# Patient Record
Sex: FEMALE | Race: WHITE | NOT HISPANIC OR LATINO | Employment: PART TIME | URBAN - METROPOLITAN AREA
[De-identification: names, ages, dates, MRNs, and addresses within clinical notes are randomized per-mention and may not be internally consistent; named-entity substitution may affect disease eponyms.]

---

## 2018-01-08 ENCOUNTER — OFFICE VISIT (OUTPATIENT)
Dept: FAMILY MEDICINE | Facility: CLINIC | Age: 28
End: 2018-01-08
Payer: COMMERCIAL

## 2018-01-08 VITALS
WEIGHT: 147.5 LBS | HEIGHT: 62 IN | DIASTOLIC BLOOD PRESSURE: 60 MMHG | BODY MASS INDEX: 27.14 KG/M2 | TEMPERATURE: 99 F | SYSTOLIC BLOOD PRESSURE: 106 MMHG | HEART RATE: 81 BPM | OXYGEN SATURATION: 99 %

## 2018-01-08 DIAGNOSIS — Z13.220 SCREENING CHOLESTEROL LEVEL: ICD-10-CM

## 2018-01-08 DIAGNOSIS — R53.82 CHRONIC FATIGUE: Primary | ICD-10-CM

## 2018-01-08 DIAGNOSIS — R11.0 NAUSEA: ICD-10-CM

## 2018-01-08 LAB
B-HCG UR QL: NEGATIVE
BILIRUB SERPL-MCNC: NEGATIVE MG/DL
BLOOD URINE, POC: ABNORMAL
COLOR, POC UA: YELLOW
CTP QC/QA: YES
GLUCOSE UR QL STRIP: NEGATIVE
KETONES UR QL STRIP: NEGATIVE
LEUKOCYTE ESTERASE URINE, POC: NEGATIVE
NITRITE, POC UA: NEGATIVE
PH, POC UA: 5
PROTEIN, POC: NEGATIVE
SPECIFIC GRAVITY, POC UA: 1.03
UROBILINOGEN, POC UA: 0.2

## 2018-01-08 PROCEDURE — 81001 URINALYSIS AUTO W/SCOPE: CPT | Mod: S$GLB,,, | Performed by: NURSE PRACTITIONER

## 2018-01-08 PROCEDURE — 99203 OFFICE O/P NEW LOW 30 MIN: CPT | Mod: 25,S$GLB,, | Performed by: NURSE PRACTITIONER

## 2018-01-08 PROCEDURE — 81025 URINE PREGNANCY TEST: CPT | Mod: S$GLB,,, | Performed by: NURSE PRACTITIONER

## 2018-01-08 PROCEDURE — 99999 PR PBB SHADOW E&M-NEW PATIENT-LVL III: CPT | Mod: PBBFAC,,, | Performed by: NURSE PRACTITIONER

## 2018-01-08 RX ORDER — ONDANSETRON 4 MG/1
4 TABLET, ORALLY DISINTEGRATING ORAL EVERY 6 HOURS PRN
Qty: 30 TABLET | Refills: 0 | Status: SHIPPED | OUTPATIENT
Start: 2018-01-08 | End: 2020-08-10 | Stop reason: ALTCHOICE

## 2018-01-08 RX ORDER — NORGESTIMATE AND ETHINYL ESTRADIOL 0.25-0.035
KIT ORAL
COMMUNITY
Start: 2018-01-07 | End: 2020-08-10 | Stop reason: ALTCHOICE

## 2018-01-08 NOTE — PROGRESS NOTES
Subjective:       Patient ID: Ruy Gayle is a 27 y.o. female.    Chief Complaint: Establish Care; Fatigue (both started over the summer); and Nausea    ###NEW PATIENT###    Patient is a 27 year old white female here today with complaint of chronic fatigue and nausea for the past 6 months.  See notes below.    Fatigue - chronic.  Getting adequate rest - going to bed for 9 PM and awakening around 5:30 AM.  Reports she had menstrual cycle every 28 days lasting 5 to 7 days per cycle - reports heavy bleeding on day 2 - having to change pad every 2 to 3 hours.  See notes below.    Nausea - intermittent and chronic over past 6 months.  Reports that the nausea is worse in the evenings and gets relief with eating.  Reports does have some gas and bloating intermittently.  Normal bowel movements.  No vomiting.  No urinary symptoms.      Fatigue   This is a chronic problem. Episode onset: past 6 months. The problem occurs constantly. The problem has been gradually worsening. Associated symptoms include fatigue, headaches and nausea. Pertinent negatives include no abdominal pain, arthralgias, change in bowel habit, chest pain, chills, congestion, coughing, fever, joint swelling, myalgias, rash, sore throat, urinary symptoms, vomiting or weakness. Nothing aggravates the symptoms. She has tried rest and sleep for the symptoms. The treatment provided no relief.   Nausea   This is a chronic problem. Episode onset: past 6 months. The problem occurs intermittently (worse in the evening). The problem has been waxing and waning. Associated symptoms include fatigue, headaches and nausea. Pertinent negatives include no abdominal pain, arthralgias, change in bowel habit, chest pain, chills, congestion, coughing, fever, joint swelling, myalgias, rash, sore throat, urinary symptoms, vomiting or weakness. Associated symptoms comments: Nausea is worse in the evenings and improves with eating.. Nothing aggravates the symptoms. She has  tried eating for the symptoms. The treatment provided moderate relief.       Previous Medications    ESTARYLLA 0.25-35 MG-MCG PER TABLET           Past Medical History:   Diagnosis Date    Endometriosis     Dr. Ynes Kaye    Ovarian cyst 3/16/2012    LPS RSO for dermoid       Past Surgical History:   Procedure Laterality Date    ovarian cystectomy and then right salpingo-oophorectomy      WISDOM TOOTH EXTRACTION         Family History   Problem Relation Age of Onset    Hypertension Father 45    Deep vein thrombosis Mother 47     had broken ankle and developed DVT from immobilization    Polycystic ovary syndrome Sister     Irritable bowel syndrome Brother        Social History     Social History    Marital status: Single     Spouse name: N/A    Number of children: N/A    Years of education: N/A     Occupational History          Social History Main Topics    Smoking status: Never Smoker    Smokeless tobacco: None    Alcohol use Yes      Comment: rarely - once every 2 to 3 months    Drug use: No    Sexual activity: Yes     Partners: Male     Birth control/ protection: OCP     Other Topics Concern    None     Social History Narrative    None       Review of Systems   Constitutional: Positive for fatigue. Negative for appetite change, chills, fever and unexpected weight change.   HENT: Negative for congestion, ear pain, mouth sores, nosebleeds, postnasal drip, rhinorrhea, sinus pressure, sneezing, sore throat, trouble swallowing and voice change.    Eyes: Negative for photophobia, pain, discharge, redness, itching and visual disturbance.   Respiratory: Negative for cough, chest tightness and shortness of breath.    Cardiovascular: Negative for chest pain, palpitations and leg swelling.   Gastrointestinal: Positive for nausea. Negative for abdominal pain, blood in stool, change in bowel habit, constipation, diarrhea and vomiting.   Genitourinary: Negative for dysuria,  "frequency, hematuria and urgency.   Musculoskeletal: Negative for arthralgias, back pain, joint swelling and myalgias.   Skin: Negative for color change and rash.   Allergic/Immunologic: Negative for immunocompromised state.   Neurological: Positive for headaches. Negative for dizziness, seizures, syncope and weakness.   Hematological: Negative for adenopathy. Does not bruise/bleed easily.   Psychiatric/Behavioral: Negative for agitation, dysphoric mood, sleep disturbance and suicidal ideas. The patient is not nervous/anxious.          Objective:     Vitals:    01/08/18 0807   BP: 106/60   BP Location: Right arm   Patient Position: Sitting   BP Method: Medium (Manual)   Pulse: 81   Temp: 98.6 °F (37 °C)   TempSrc: Oral   SpO2: 99%   Weight: 66.9 kg (147 lb 7.8 oz)   Height: 5' 2" (1.575 m)          Physical Exam   Constitutional: She is oriented to person, place, and time. She appears well-developed and well-nourished.   HENT:   Head: Normocephalic and atraumatic.   Right Ear: External ear normal.   Left Ear: External ear normal.   Nose: Nose normal.   Mouth/Throat: Oropharynx is clear and moist. No oropharyngeal exudate.   Eyes: EOM are normal. Pupils are equal, round, and reactive to light.   Neck: Normal range of motion. Neck supple. No tracheal deviation present. No thyromegaly present.   Cardiovascular: Normal rate, regular rhythm and normal heart sounds.    No murmur heard.  Pulmonary/Chest: Effort normal and breath sounds normal. No respiratory distress.   Abdominal: Soft. Bowel sounds are normal. She exhibits no distension and no mass. There is no tenderness. There is no rebound and no guarding.   Musculoskeletal: Normal range of motion. She exhibits no edema.   Lymphadenopathy:     She has no cervical adenopathy.   Neurological: She is alert and oriented to person, place, and time. No cranial nerve deficit. Coordination normal.   Skin: Skin is warm and dry. No rash noted.   Psychiatric: She has a normal " mood and affect.       Office Visit on 01/08/2018   Component Date Value Ref Range Status    POC Preg Test, Ur 01/08/2018 Negative  Negative Final     Acceptable 01/08/2018 Yes   Final    Color, UA 01/08/2018 YELLOW   Final    Spec Grav UA 01/08/2018 1.030   Final    pH, UA 01/08/2018 5.0   Final    WBC, UA 01/08/2018 NEGATIVE   Final    Nitrite, UA 01/08/2018 NEGATIVE   Final    Protein 01/08/2018 NEGATIVE   Final    Glucose, UA 01/08/2018 NEGATIVE   Final    Ketones, UA 01/08/2018 NEGATIVE   Final    Urobilinogen, UA 01/08/2018 0.2   Final    Bilirubin 01/08/2018 NEGATIVE   Final    Blood, UA 01/08/2018 TRACE   Final       Assessment:         ICD-10-CM ICD-9-CM   1. Chronic fatigue R53.82 780.79   2. Nausea R11.0 787.02   3. Screening cholesterol level Z13.220 V77.91       Plan:       Chronic fatigue  -  Will get CBC to rule out anemia and check vitamin and iron levels.  Will get TSH to rule out any thyroid problem.  Patient will return next week for results and further evaluation.  -     CBC auto differential; Future; Expected date: 01/08/2018  -     TSH; Future; Expected date: 01/08/2018  -     Iron and TIBC; Future; Expected date: 01/08/2018  -     Ferritin; Future; Expected date: 01/08/2018  -     Vitamin B12; Future; Expected date: 01/08/2018  -     Vitamin D; Future; Expected date: 01/08/2018    Nausea  -  Urinalysis negative for infection - trace blood only - will recheck in couple weeks..  Negative for pregnancy.  Will treat with Zofran prn and trial Zantac 150 mg twice daily to see if symptoms improve - patient could have increased gastric acid on empty stomach causing the nausea and that is why food improves symptoms. Recheck in 7 to 14 days when patient returns.  -     POCT Urine Pregnancy  -     POCT urinalysis, dipstick or tablet reag  -     Comprehensive metabolic panel; Future; Expected date: 01/08/2018  -     ranitidine (ZANTAC) 150 MG capsule; Take 1 capsule (150 mg  total) by mouth 2 (two) times daily.  Dispense: 60 capsule; Refill: 1  -     ondansetron (ZOFRAN-ODT) 4 MG TbDL; Take 1 tablet (4 mg total) by mouth every 6 (six) hours as needed (nausea).  Dispense: 30 tablet; Refill: 0    Screening cholesterol level  -     Lipid panel; Future; Expected date: 01/08/2018      Return in about 1 week (around 1/15/2018) for follow up.     Patient's Medications   New Prescriptions    ONDANSETRON (ZOFRAN-ODT) 4 MG TBDL    Take 1 tablet (4 mg total) by mouth every 6 (six) hours as needed (nausea).    RANITIDINE (ZANTAC) 150 MG CAPSULE    Take 1 capsule (150 mg total) by mouth 2 (two) times daily.   Previous Medications    ESTARYLLA 0.25-35 MG-MCG PER TABLET       Modified Medications    No medications on file   Discontinued Medications    DROSPIRENONE-E.ESTRADIOL-LM.FA (BEYAZ) 3-0.02-0.451 MG (24) TAB    Take 1 Package by mouth once daily.

## 2018-01-12 ENCOUNTER — LAB VISIT (OUTPATIENT)
Dept: LAB | Facility: HOSPITAL | Age: 28
End: 2018-01-12
Attending: NURSE PRACTITIONER
Payer: COMMERCIAL

## 2018-01-12 DIAGNOSIS — R53.82 CHRONIC FATIGUE: ICD-10-CM

## 2018-01-12 DIAGNOSIS — R11.0 NAUSEA: ICD-10-CM

## 2018-01-12 DIAGNOSIS — Z13.220 SCREENING CHOLESTEROL LEVEL: ICD-10-CM

## 2018-01-12 LAB
25(OH)D3+25(OH)D2 SERPL-MCNC: 19 NG/ML
ALBUMIN SERPL BCP-MCNC: 3.9 G/DL
ALP SERPL-CCNC: 64 U/L
ALT SERPL W/O P-5'-P-CCNC: 18 U/L
ANION GAP SERPL CALC-SCNC: 9 MMOL/L
AST SERPL-CCNC: 19 U/L
BASOPHILS # BLD AUTO: 0.01 K/UL
BASOPHILS NFR BLD: 0.1 %
BILIRUB SERPL-MCNC: 0.7 MG/DL
BUN SERPL-MCNC: 11 MG/DL
CALCIUM SERPL-MCNC: 9.4 MG/DL
CHLORIDE SERPL-SCNC: 105 MMOL/L
CHOLEST SERPL-MCNC: 153 MG/DL
CHOLEST/HDLC SERPL: 1.9 {RATIO}
CO2 SERPL-SCNC: 26 MMOL/L
CREAT SERPL-MCNC: 0.7 MG/DL
DIFFERENTIAL METHOD: NORMAL
EOSINOPHIL # BLD AUTO: 0.4 K/UL
EOSINOPHIL NFR BLD: 6.3 %
ERYTHROCYTE [DISTWIDTH] IN BLOOD BY AUTOMATED COUNT: 12.3 %
EST. GFR  (AFRICAN AMERICAN): >60 ML/MIN/1.73 M^2
EST. GFR  (NON AFRICAN AMERICAN): >60 ML/MIN/1.73 M^2
FERRITIN SERPL-MCNC: 83 NG/ML
GLUCOSE SERPL-MCNC: 75 MG/DL
HCT VFR BLD AUTO: 38.8 %
HDLC SERPL-MCNC: 82 MG/DL
HDLC SERPL: 53.6 %
HGB BLD-MCNC: 12.7 G/DL
IRON SERPL-MCNC: 102 UG/DL
LDLC SERPL CALC-MCNC: 62.6 MG/DL
LYMPHOCYTES # BLD AUTO: 1.8 K/UL
LYMPHOCYTES NFR BLD: 26.3 %
MCH RBC QN AUTO: 28.1 PG
MCHC RBC AUTO-ENTMCNC: 32.7 G/DL
MCV RBC AUTO: 86 FL
MONOCYTES # BLD AUTO: 0.4 K/UL
MONOCYTES NFR BLD: 6.1 %
NEUTROPHILS # BLD AUTO: 4.1 K/UL
NEUTROPHILS NFR BLD: 60.9 %
NONHDLC SERPL-MCNC: 71 MG/DL
PLATELET # BLD AUTO: 215 K/UL
PMV BLD AUTO: 9.7 FL
POTASSIUM SERPL-SCNC: 4.3 MMOL/L
PROT SERPL-MCNC: 7 G/DL
RBC # BLD AUTO: 4.52 M/UL
SATURATED IRON: 23 %
SODIUM SERPL-SCNC: 140 MMOL/L
TOTAL IRON BINDING CAPACITY: 453 UG/DL
TRANSFERRIN SERPL-MCNC: 306 MG/DL
TRIGL SERPL-MCNC: 42 MG/DL
TSH SERPL DL<=0.005 MIU/L-ACNC: 1.28 UIU/ML
VIT B12 SERPL-MCNC: 504 PG/ML
WBC # BLD AUTO: 6.69 K/UL

## 2018-01-12 PROCEDURE — 85025 COMPLETE CBC W/AUTO DIFF WBC: CPT

## 2018-01-12 PROCEDURE — 82306 VITAMIN D 25 HYDROXY: CPT

## 2018-01-12 PROCEDURE — 80053 COMPREHEN METABOLIC PANEL: CPT

## 2018-01-12 PROCEDURE — 80061 LIPID PANEL: CPT

## 2018-01-12 PROCEDURE — 82728 ASSAY OF FERRITIN: CPT

## 2018-01-12 PROCEDURE — 83540 ASSAY OF IRON: CPT

## 2018-01-12 PROCEDURE — 84443 ASSAY THYROID STIM HORMONE: CPT

## 2018-01-12 PROCEDURE — 82607 VITAMIN B-12: CPT

## 2018-01-12 PROCEDURE — 36415 COLL VENOUS BLD VENIPUNCTURE: CPT

## 2018-01-18 ENCOUNTER — OFFICE VISIT (OUTPATIENT)
Dept: FAMILY MEDICINE | Facility: CLINIC | Age: 28
End: 2018-01-18
Payer: COMMERCIAL

## 2018-01-18 VITALS
BODY MASS INDEX: 27.3 KG/M2 | OXYGEN SATURATION: 99 % | DIASTOLIC BLOOD PRESSURE: 60 MMHG | TEMPERATURE: 98 F | SYSTOLIC BLOOD PRESSURE: 100 MMHG | WEIGHT: 148.38 LBS | HEIGHT: 62 IN | HEART RATE: 66 BPM

## 2018-01-18 DIAGNOSIS — R53.82 CHRONIC FATIGUE: ICD-10-CM

## 2018-01-18 DIAGNOSIS — R31.9 HEMATURIA, UNSPECIFIED TYPE: ICD-10-CM

## 2018-01-18 DIAGNOSIS — R11.0 NAUSEA: ICD-10-CM

## 2018-01-18 DIAGNOSIS — E55.9 VITAMIN D DEFICIENCY: Primary | ICD-10-CM

## 2018-01-18 LAB
BILIRUB SERPL-MCNC: NEGATIVE MG/DL
BLOOD URINE, POC: ABNORMAL
COLOR, POC UA: YELLOW
GLUCOSE UR QL STRIP: NEGATIVE
KETONES UR QL STRIP: NEGATIVE
LEUKOCYTE ESTERASE URINE, POC: NEGATIVE
NITRITE, POC UA: NEGATIVE
PH, POC UA: 5
PROTEIN, POC: NEGATIVE
SPECIFIC GRAVITY, POC UA: 1.03
UROBILINOGEN, POC UA: 0.2

## 2018-01-18 PROCEDURE — 81001 URINALYSIS AUTO W/SCOPE: CPT | Mod: S$GLB,,, | Performed by: NURSE PRACTITIONER

## 2018-01-18 PROCEDURE — 99999 PR PBB SHADOW E&M-EST. PATIENT-LVL IV: CPT | Mod: PBBFAC,,, | Performed by: NURSE PRACTITIONER

## 2018-01-18 PROCEDURE — 99214 OFFICE O/P EST MOD 30 MIN: CPT | Mod: 25,S$GLB,, | Performed by: NURSE PRACTITIONER

## 2018-01-18 RX ORDER — ERGOCALCIFEROL 1.25 MG/1
50000 CAPSULE ORAL
Qty: 4 CAPSULE | Refills: 3 | Status: SHIPPED | OUTPATIENT
Start: 2018-01-18 | End: 2018-05-18 | Stop reason: SDUPTHER

## 2018-01-18 NOTE — PROGRESS NOTES
Subjective:       Patient ID: Ruy Gayle is a 27 y.o. female.    Chief Complaint: Follow-up (lab results)    Patient is here today for follow up with fasting lab results.    Patient was seen early January 2018 with complains of chronic fatigue and nausea.    Chronic fatigue - the CBC, iron, ferritin, vitamin B12, TSH level all within normal range.  No anemia or thyroid problems present.  Patient did have a LOW Vitamin D level 19 - vitamin D deficiency present.    Nausea - Patient reports [facility] was out of the Zantac so she did not have it filled until this past weekend so only on medication for less than 1 week - too soon to tell if the medication will make a difference.  She does report that the Zofran did resolve the nausea when taken.    Trace Blood in Urine - patient had trace blood in urine at last visit.  Will recheck urinalysis today - patient has not been on Menstrual cycle since 12/23/17 so should not have blood from menstrual cycle.  No urinary symptoms are present.    Screening cholesterol levels are excellent.      Lab Visit on 01/12/2018   Component Date Value Ref Range Status    WBC 01/12/2018 6.69  3.90 - 12.70 K/uL Final    RBC 01/12/2018 4.52  4.00 - 5.40 M/uL Final    Hemoglobin 01/12/2018 12.7  12.0 - 16.0 g/dL Final    Hematocrit 01/12/2018 38.8  37.0 - 48.5 % Final    MCV 01/12/2018 86  82 - 98 fL Final    MCH 01/12/2018 28.1  27.0 - 31.0 pg Final    MCHC 01/12/2018 32.7  32.0 - 36.0 g/dL Final    RDW 01/12/2018 12.3  11.5 - 14.5 % Final    Platelets 01/12/2018 215  150 - 350 K/uL Final    MPV 01/12/2018 9.7  9.2 - 12.9 fL Final    Gran # 01/12/2018 4.1  1.8 - 7.7 K/uL Final    Lymph # 01/12/2018 1.8  1.0 - 4.8 K/uL Final    Mono # 01/12/2018 0.4  0.3 - 1.0 K/uL Final    Eos # 01/12/2018 0.4  0.0 - 0.5 K/uL Final    Baso # 01/12/2018 0.01  0.00 - 0.20 K/uL Final    Gran% 01/12/2018 60.9  38.0 - 73.0 % Final    Lymph% 01/12/2018 26.3  18.0 - 48.0 % Final    Mono% 01/12/2018  6.1  4.0 - 15.0 % Final    Eosinophil% 01/12/2018 6.3  0.0 - 8.0 % Final    Basophil% 01/12/2018 0.1  0.0 - 1.9 % Final    Differential Method 01/12/2018 Automated   Final    Sodium 01/12/2018 140  136 - 145 mmol/L Final    Potassium 01/12/2018 4.3  3.5 - 5.1 mmol/L Final    Chloride 01/12/2018 105  95 - 110 mmol/L Final    CO2 01/12/2018 26  23 - 29 mmol/L Final    Glucose 01/12/2018 75  70 - 110 mg/dL Final    BUN, Bld 01/12/2018 11  6 - 20 mg/dL Final    Creatinine 01/12/2018 0.7  0.5 - 1.4 mg/dL Final    Calcium 01/12/2018 9.4  8.7 - 10.5 mg/dL Final    Total Protein 01/12/2018 7.0  6.0 - 8.4 g/dL Final    Albumin 01/12/2018 3.9  3.5 - 5.2 g/dL Final    Total Bilirubin 01/12/2018 0.7  0.1 - 1.0 mg/dL Final    Comment: For infants and newborns, interpretation of results should be based  on gestational age, weight and in agreement with clinical  observations.  Premature Infant recommended reference ranges:  Up to 24 hours.............<8.0 mg/dL  Up to 48 hours............<12.0 mg/dL  3-5 days..................<15.0 mg/dL  6-29 days.................<15.0 mg/dL      Alkaline Phosphatase 01/12/2018 64  55 - 135 U/L Final    AST 01/12/2018 19  10 - 40 U/L Final    ALT 01/12/2018 18  10 - 44 U/L Final    Anion Gap 01/12/2018 9  8 - 16 mmol/L Final    eGFR if African American 01/12/2018 >60  >60 mL/min/1.73 m^2 Final    eGFR if non African American 01/12/2018 >60  >60 mL/min/1.73 m^2 Final    Comment: Calculation used to obtain the estimated glomerular filtration  rate (eGFR) is the CKD-EPI equation.       Cholesterol 01/12/2018 153  120 - 199 mg/dL Final    Comment: The National Cholesterol Education Program (NCEP) has set the  following guidelines (reference ranges) for Cholesterol:  Optimal.....................<200 mg/dL  Borderline High.............200-239 mg/dL  High........................> or = 240 mg/dL      Triglycerides 01/12/2018 42  30 - 150 mg/dL Final    Comment: The National  Cholesterol Education Program (NCEP) has set the  following guidelines (reference values) for triglycerides:  Normal......................<150 mg/dL  Borderline High.............150-199 mg/dL  High........................200-499 mg/dL      HDL 01/12/2018 82* 40 - 75 mg/dL Final    Comment: The National Cholesterol Education Program (NCEP) has set the  following guidelines (reference values) for HDL Cholesterol:  Low...............<40 mg/dL  Optimal...........>60 mg/dL      LDL Cholesterol 01/12/2018 62.6* 63.0 - 159.0 mg/dL Final    Comment: The National Cholesterol Education Program (NCEP) has set the  following guidelines (reference values) for LDL Cholesterol:  Optimal.......................<130 mg/dL  Borderline High...............130-159 mg/dL  High..........................160-189 mg/dL  Very High.....................>190 mg/dL      HDL/Chol Ratio 01/12/2018 53.6* 20.0 - 50.0 % Final    Total Cholesterol/HDL Ratio 01/12/2018 1.9* 2.0 - 5.0 Final    Non-HDL Cholesterol 01/12/2018 71  mg/dL Final    Comment: Risk category and Non-HDL cholesterol goals:  Coronary heart disease (CHD)or equivalent (10-year risk of CHD >20%):  Non-HDL cholesterol goal     <130 mg/dL  Two or more CHD risk factors and 10-year risk of CHD <= 20%:  Non-HDL cholesterol goal     <160 mg/dL  0 to 1 CHD risk factor:  Non-HDL cholesterol goal     <190 mg/dL      TSH 01/12/2018 1.282  0.400 - 4.000 uIU/mL Final    Iron 01/12/2018 102  30 - 160 ug/dL Final    Transferrin 01/12/2018 306  200 - 375 mg/dL Final    TIBC 01/12/2018 453* 250 - 450 ug/dL Final    Saturated Iron 01/12/2018 23  20 - 50 % Final    Ferritin 01/12/2018 83  20.0 - 300.0 ng/mL Final    Vitamin B-12 01/12/2018 504  210 - 950 pg/mL Final    Vit D, 25-Hydroxy 01/12/2018 19* 30 - 96 ng/mL Final    Comment: Vitamin D deficiency.........<10 ng/mL                              Vitamin D insufficiency......10-29 ng/mL       Vitamin D sufficiency........> or equal to 30  ng/mL  Vitamin D toxicity............>100 ng/mL     Office Visit on 01/08/2018   Component Date Value Ref Range Status    POC Preg Test, Ur 01/08/2018 Negative  Negative Final     Acceptable 01/08/2018 Yes   Final    Color, UA 01/08/2018 YELLOW   Final    Spec Grav UA 01/08/2018 1.030   Final    pH, UA 01/08/2018 5.0   Final    WBC, UA 01/08/2018 NEGATIVE   Final    Nitrite, UA 01/08/2018 NEGATIVE   Final    Protein 01/08/2018 NEGATIVE   Final    Glucose, UA 01/08/2018 NEGATIVE   Final    Ketones, UA 01/08/2018 NEGATIVE   Final    Urobilinogen, UA 01/08/2018 0.2   Final    Bilirubin 01/08/2018 NEGATIVE   Final    Blood, UA 01/08/2018 TRACE   Final       Previous Medications    ESTARYLLA 0.25-35 MG-MCG PER TABLET        ONDANSETRON (ZOFRAN-ODT) 4 MG TBDL    Take 1 tablet (4 mg total) by mouth every 6 (six) hours as needed (nausea).    RANITIDINE (ZANTAC) 150 MG CAPSULE    Take 1 capsule (150 mg total) by mouth 2 (two) times daily.       Past Medical History:   Diagnosis Date    Endometriosis     Dr. Ynes Kaye    Ovarian cyst 3/16/2012    LPS RSO for dermoid       Past Surgical History:   Procedure Laterality Date    ovarian cystectomy and then right salpingo-oophorectomy      WISDOM TOOTH EXTRACTION         Family History   Problem Relation Age of Onset    Hypertension Father 45    Deep vein thrombosis Mother 47     had broken ankle and developed DVT from immobilization    Polycystic ovary syndrome Sister     Irritable bowel syndrome Brother        Social History     Social History    Marital status: Single     Spouse name: N/A    Number of children: N/A    Years of education: N/A     Occupational History          Social History Main Topics    Smoking status: Never Smoker    Smokeless tobacco: None    Alcohol use Yes      Comment: rarely - once every 2 to 3 months    Drug use: No    Sexual activity: Yes     Partners: Male     Birth control/  "protection: OCP     Other Topics Concern    None     Social History Narrative    None       Review of Systems   Constitutional: Positive for fatigue. Negative for appetite change, chills, fever and unexpected weight change.   HENT: Negative for congestion, ear pain, mouth sores, nosebleeds, postnasal drip, rhinorrhea, sinus pressure, sneezing, sore throat, trouble swallowing and voice change.    Eyes: Negative for photophobia, pain, discharge, redness, itching and visual disturbance.   Respiratory: Negative for cough, chest tightness and shortness of breath.    Cardiovascular: Negative for chest pain, palpitations and leg swelling.   Gastrointestinal: Positive for nausea. Negative for abdominal pain, blood in stool, constipation, diarrhea and vomiting.        Nausea has improved since last visit   Genitourinary: Negative for dysuria, frequency, hematuria and urgency.   Musculoskeletal: Negative for arthralgias, back pain, joint swelling and myalgias.   Skin: Negative for color change and rash.   Allergic/Immunologic: Negative for immunocompromised state.   Neurological: Positive for headaches. Negative for dizziness, seizures, syncope and weakness.   Hematological: Negative for adenopathy. Does not bruise/bleed easily.   Psychiatric/Behavioral: Negative for agitation, dysphoric mood, sleep disturbance and suicidal ideas. The patient is not nervous/anxious.          Objective:     Vitals:    01/18/18 0803   BP: 100/60   BP Location: Right arm   Patient Position: Sitting   BP Method: Medium (Manual)   Pulse: 66   Temp: 98 °F (36.7 °C)   TempSrc: Oral   SpO2: 99%   Weight: 67.3 kg (148 lb 5.9 oz)   Height: 5' 2" (1.575 m)          Physical Exam   Constitutional: She is oriented to person, place, and time. She appears well-developed and well-nourished.   HENT:   Head: Normocephalic and atraumatic.   Right Ear: External ear normal.   Left Ear: External ear normal.   Nose: Nose normal.   Mouth/Throat: Oropharynx is " clear and moist. No oropharyngeal exudate.   Eyes: EOM are normal. Pupils are equal, round, and reactive to light.   Neck: Normal range of motion. Neck supple. No tracheal deviation present. No thyromegaly present.   Cardiovascular: Normal rate, regular rhythm and normal heart sounds.    No murmur heard.  Pulmonary/Chest: Effort normal and breath sounds normal. No respiratory distress.   Abdominal: Soft. Bowel sounds are normal. She exhibits no distension and no mass. There is no tenderness. There is no rebound and no guarding.   Musculoskeletal: Normal range of motion. She exhibits no edema.   Lymphadenopathy:     She has no cervical adenopathy.   Neurological: She is alert and oriented to person, place, and time. No cranial nerve deficit. Coordination normal.   Skin: Skin is warm and dry. No rash noted.   Psychiatric: She has a normal mood and affect.       Office Visit on 01/18/2018   Component Date Value Ref Range Status    Color, UA 01/18/2018 YELLOW   Final    Spec Grav UA 01/18/2018 1.030   Final    pH, UA 01/18/2018 5.0   Final    WBC, UA 01/18/2018 NEGATIVE   Final    Nitrite, UA 01/18/2018 NEGATIVE   Final    Protein 01/18/2018 NEGATIVE   Final    Glucose, UA 01/18/2018 NEGATIVE   Final    Ketones, UA 01/18/2018 NEGATIVE   Final    Urobilinogen, UA 01/18/2018 0.2   Final    Bilirubin 01/18/2018 NEGATIVE   Final    Blood, UA 01/18/2018 TRACE-INTACT   Final       Assessment:         ICD-10-CM ICD-9-CM   1. Vitamin D deficiency E55.9 268.9   2. Chronic fatigue R53.82 780.79   3. Nausea R11.0 787.02   4. Hematuria, unspecified type R31.9 599.70       Plan:       Vitamin D deficiency  -  Start Vitamin D 50,000 units every 7 days.  Will repeat level in 3 months.  -     ergocalciferol (ERGOCALCIFEROL) 50,000 unit Cap; Take 1 capsule (50,000 Units total) by mouth every 7 days.  Dispense: 4 capsule; Refill: 3  -     Vitamin D; Future; Expected date: 01/18/2018    Chronic fatigue  -  Will reassess in 3  months - no identifiable cause other than Vitamin D deficiency.    Nausea  -  Just started on the Zantac but does reports she finds that the nausea is less frequent - will reassess in 3 months.    Hematuria, unspecified type  -  + trace, intact blood on urinalysis twice and not on menstrual cycle - will refer to urology for evaluation.  -     POCT urinalysis, dipstick or tablet reag  -     Ambulatory Referral to Urology      Follow-up in about 3 months (around 4/18/2018) for vitamin D lab and then visit for results.     Patient's Medications   New Prescriptions    ERGOCALCIFEROL (ERGOCALCIFEROL) 50,000 UNIT CAP    Take 1 capsule (50,000 Units total) by mouth every 7 days.   Previous Medications    ESTARYLLA 0.25-35 MG-MCG PER TABLET        ONDANSETRON (ZOFRAN-ODT) 4 MG TBDL    Take 1 tablet (4 mg total) by mouth every 6 (six) hours as needed (nausea).    RANITIDINE (ZANTAC) 150 MG CAPSULE    Take 1 capsule (150 mg total) by mouth 2 (two) times daily.   Modified Medications    No medications on file   Discontinued Medications    No medications on file

## 2018-01-22 ENCOUNTER — OFFICE VISIT (OUTPATIENT)
Dept: UROLOGY | Facility: CLINIC | Age: 28
End: 2018-01-22
Payer: COMMERCIAL

## 2018-01-22 VITALS
BODY MASS INDEX: 27.23 KG/M2 | HEIGHT: 62 IN | HEART RATE: 56 BPM | WEIGHT: 148 LBS | DIASTOLIC BLOOD PRESSURE: 78 MMHG | SYSTOLIC BLOOD PRESSURE: 126 MMHG

## 2018-01-22 DIAGNOSIS — R31.29 MICROSCOPIC HEMATURIA: Primary | ICD-10-CM

## 2018-01-22 PROCEDURE — 99204 OFFICE O/P NEW MOD 45 MIN: CPT | Mod: 25,S$GLB,, | Performed by: STUDENT IN AN ORGANIZED HEALTH CARE EDUCATION/TRAINING PROGRAM

## 2018-01-22 PROCEDURE — 99999 PR PBB SHADOW E&M-EST. PATIENT-LVL III: CPT | Mod: PBBFAC,,, | Performed by: STUDENT IN AN ORGANIZED HEALTH CARE EDUCATION/TRAINING PROGRAM

## 2018-01-22 PROCEDURE — 87077 CULTURE AEROBIC IDENTIFY: CPT

## 2018-01-22 PROCEDURE — 87186 SC STD MICRODIL/AGAR DIL: CPT

## 2018-01-22 PROCEDURE — 81002 URINALYSIS NONAUTO W/O SCOPE: CPT | Mod: S$GLB,,, | Performed by: STUDENT IN AN ORGANIZED HEALTH CARE EDUCATION/TRAINING PROGRAM

## 2018-01-22 PROCEDURE — 81001 URINALYSIS AUTO W/SCOPE: CPT

## 2018-01-22 PROCEDURE — 87086 URINE CULTURE/COLONY COUNT: CPT

## 2018-01-22 PROCEDURE — 87088 URINE BACTERIA CULTURE: CPT

## 2018-01-22 NOTE — PROGRESS NOTES
Subjective:       Patient ID: Ruy Gayle is a 27 y.o. female.    Chief Complaint: microscopic hematuria  This is a 27 y.o.  female patient that is new to me.  The patient is referred to me by her PCP Flor Christian NP for microscopic hematuria.  She has trace blood in the urine on 1/8 and 1/18. No other abnormalities on urinalysis detected. Denies dysuria, urinary incontinence, suprapubic pain or other UTI-type symptoms. Denies gross hematuria, history of kidney stones. She does endorse vague bilateral flank pain as well as anterior abdominal pain. She has a history of ovarian cysts, s/p R BSO and ovarian cystectomy in the past. She notes after all of that she is still experiencing this pain but was worked up by her OB Dr. Kaye and did not detect any other abnormalities.     She works in [employer] for [employer].     Lab Results   Component Value Date    CREATININE 0.7 01/12/2018     ---  Past Medical History:   Diagnosis Date    Allergy     Endometriosis     Dr. Ynes Kaye    Ovarian cyst 3/16/2012    LPS RSO for dermoid       Past Surgical History:   Procedure Laterality Date    ovarian cystectomy and then right salpingo-oophorectomy      WISDOM TOOTH EXTRACTION          Family History   Problem Relation Age of Onset    Hypertension Father 45    Deep vein thrombosis Mother 47     had broken ankle and developed DVT from immobilization    Polycystic ovary syndrome Sister     Irritable bowel syndrome Brother        Social History   Substance Use Topics    Smoking status: Never Smoker    Smokeless tobacco: Not on file    Alcohol use Yes      Comment: rarely - once every 2 to 3 months       Current Outpatient Prescriptions on File Prior to Visit   Medication Sig Dispense Refill    ergocalciferol (ERGOCALCIFEROL) 50,000 unit Cap Take 1 capsule (50,000 Units total) by mouth every 7 days. 4 capsule 3    ESTARYLLA 0.25-35 mg-mcg per tablet       ondansetron (ZOFRAN-ODT) 4 MG TbDL  Take 1 tablet (4 mg total) by mouth every 6 (six) hours as needed (nausea). 30 tablet 0    ranitidine (ZANTAC) 150 MG capsule Take 1 capsule (150 mg total) by mouth 2 (two) times daily. 60 capsule 1     No current facility-administered medications on file prior to visit.        Review of patient's allergies indicates:   Allergen Reactions    Codeine Hives       Review of Systems   Constitutional: Negative for activity change.   HENT: Negative for congestion.    Eyes: Negative for visual disturbance.   Respiratory: Negative for shortness of breath.    Cardiovascular: Negative for chest pain.   Gastrointestinal: Negative for abdominal distention.   Musculoskeletal: Negative for gait problem.   Skin: Negative for color change.   Neurological: Negative for dizziness.   Psychiatric/Behavioral: Negative for agitation.       Objective:      Physical Exam   Constitutional: She is oriented to person, place, and time. She appears well-developed.   HENT:   Head: Normocephalic and atraumatic.   Eyes: EOM are normal.   Neck: Normal range of motion.   Cardiovascular: Intact distal pulses.    Pulmonary/Chest: Effort normal.   Abdominal: Soft. There is tenderness (mild bilateral flank tenderness to percussion).   Musculoskeletal: Normal range of motion.   Neurological: She is alert and oriented to person, place, and time.   Skin: Skin is warm and dry.   Psychiatric: She has a normal mood and affect.       Assessment:       1. Microscopic hematuria        Plan:       1. I counseled the patient on the American Urology Guidelines for a hematuria workup.  The criteria for microscopic hematuria is 3 red blood cells on microscopic urinalysis and the workup is recommended for any patient experiencing gross hematuria. The workup includes a CT urogram for her age group. I will send the urinalysis today for a microscopic urinalysis as well as a urine culture to rule out UTI.   2. I will call the patient with the results of her urinalysis.  If true microscopic hematuria with 3 RBCs or more, will order a CT urogram.  3. The patient is experiencing bilateral flank pain as well as occasional anterior abdominal pain. If no clinically significant microscopic hematuria, will discuss a screening renal ultrasound to rule out urologic etiology of pain.    Microscopic hematuria  -     Urinalysis Microscopic  -     Urinalysis  -     CULTURE, URINE

## 2018-01-23 LAB
BACTERIA #/AREA URNS AUTO: NORMAL /HPF
BILIRUB UR QL STRIP: NEGATIVE
CLARITY UR REFRACT.AUTO: ABNORMAL
COLOR UR AUTO: YELLOW
GLUCOSE UR QL STRIP: NEGATIVE
HGB UR QL STRIP: NEGATIVE
KETONES UR QL STRIP: NEGATIVE
LEUKOCYTE ESTERASE UR QL STRIP: NEGATIVE
MICROSCOPIC COMMENT: NORMAL
NITRITE UR QL STRIP: POSITIVE
PH UR STRIP: 7 [PH] (ref 5–8)
PROT UR QL STRIP: NEGATIVE
SP GR UR STRIP: 1.01 (ref 1–1.03)
SQUAMOUS #/AREA URNS AUTO: 2 /HPF
URN SPEC COLLECT METH UR: ABNORMAL
UROBILINOGEN UR STRIP-ACNC: NEGATIVE EU/DL
WBC #/AREA URNS AUTO: 0 /HPF (ref 0–5)

## 2018-01-25 ENCOUNTER — TELEPHONE (OUTPATIENT)
Dept: UROLOGY | Facility: CLINIC | Age: 28
End: 2018-01-25

## 2018-01-25 DIAGNOSIS — R10.9 FLANK PAIN: Primary | ICD-10-CM

## 2018-01-25 DIAGNOSIS — N39.0 URINARY TRACT INFECTION WITHOUT HEMATURIA, SITE UNSPECIFIED: ICD-10-CM

## 2018-01-25 LAB — BACTERIA UR CULT: NORMAL

## 2018-02-12 ENCOUNTER — HOSPITAL ENCOUNTER (OUTPATIENT)
Dept: RADIOLOGY | Facility: HOSPITAL | Age: 28
Discharge: HOME | End: 2018-02-12
Attending: STUDENT IN AN ORGANIZED HEALTH CARE EDUCATION/TRAINING PROGRAM
Payer: COMMERCIAL

## 2018-02-12 DIAGNOSIS — N39.0 URINARY TRACT INFECTION WITHOUT HEMATURIA, SITE UNSPECIFIED: ICD-10-CM

## 2018-02-12 DIAGNOSIS — R10.9 FLANK PAIN: ICD-10-CM

## 2018-02-12 PROCEDURE — 76770 US EXAM ABDO BACK WALL COMP: CPT | Mod: TC

## 2018-02-12 PROCEDURE — 76770 US EXAM ABDO BACK WALL COMP: CPT | Mod: 26,,, | Performed by: RADIOLOGY

## 2018-04-24 ENCOUNTER — LAB VISIT (OUTPATIENT)
Dept: LAB | Facility: HOSPITAL | Age: 28
End: 2018-04-24
Attending: NURSE PRACTITIONER
Payer: COMMERCIAL

## 2018-04-24 DIAGNOSIS — E55.9 VITAMIN D DEFICIENCY: ICD-10-CM

## 2018-04-24 LAB — 25(OH)D3+25(OH)D2 SERPL-MCNC: 28 NG/ML

## 2018-04-24 PROCEDURE — 36415 COLL VENOUS BLD VENIPUNCTURE: CPT

## 2018-04-24 PROCEDURE — 82306 VITAMIN D 25 HYDROXY: CPT

## 2018-05-03 ENCOUNTER — PATIENT MESSAGE (OUTPATIENT)
Dept: FAMILY MEDICINE | Facility: CLINIC | Age: 28
End: 2018-05-03

## 2018-05-18 DIAGNOSIS — E55.9 VITAMIN D DEFICIENCY: ICD-10-CM

## 2018-05-18 RX ORDER — ERGOCALCIFEROL 1.25 MG/1
CAPSULE ORAL
Qty: 4 CAPSULE | Refills: 0 | Status: SHIPPED | OUTPATIENT
Start: 2018-05-18 | End: 2020-08-10 | Stop reason: ALTCHOICE

## 2018-05-24 ENCOUNTER — PATIENT MESSAGE (OUTPATIENT)
Dept: FAMILY MEDICINE | Facility: CLINIC | Age: 28
End: 2018-05-24

## 2018-06-17 DIAGNOSIS — E55.9 VITAMIN D DEFICIENCY: ICD-10-CM

## 2018-06-20 RX ORDER — ERGOCALCIFEROL 1.25 MG/1
CAPSULE ORAL
Qty: 4 CAPSULE | Refills: 0 | OUTPATIENT
Start: 2018-06-20

## 2020-08-10 ENCOUNTER — OFFICE VISIT (OUTPATIENT)
Dept: FAMILY MEDICINE | Facility: CLINIC | Age: 30
End: 2020-08-10
Payer: COMMERCIAL

## 2020-08-10 VITALS
HEIGHT: 62 IN | SYSTOLIC BLOOD PRESSURE: 110 MMHG | HEART RATE: 60 BPM | TEMPERATURE: 98 F | DIASTOLIC BLOOD PRESSURE: 68 MMHG | OXYGEN SATURATION: 98 % | BODY MASS INDEX: 31.13 KG/M2 | WEIGHT: 169.19 LBS

## 2020-08-10 DIAGNOSIS — M65.4 TENOSYNOVITIS, DE QUERVAIN: Primary | ICD-10-CM

## 2020-08-10 PROCEDURE — 99999 PR PBB SHADOW E&M-EST. PATIENT-LVL V: CPT | Mod: PBBFAC,,, | Performed by: NURSE PRACTITIONER

## 2020-08-10 PROCEDURE — 3008F BODY MASS INDEX DOCD: CPT | Mod: CPTII,S$GLB,, | Performed by: NURSE PRACTITIONER

## 2020-08-10 PROCEDURE — 99213 PR OFFICE/OUTPT VISIT, EST, LEVL III, 20-29 MIN: ICD-10-PCS | Mod: S$GLB,,, | Performed by: NURSE PRACTITIONER

## 2020-08-10 PROCEDURE — 99213 OFFICE O/P EST LOW 20 MIN: CPT | Mod: S$GLB,,, | Performed by: NURSE PRACTITIONER

## 2020-08-10 PROCEDURE — 99999 PR PBB SHADOW E&M-EST. PATIENT-LVL V: ICD-10-PCS | Mod: PBBFAC,,, | Performed by: NURSE PRACTITIONER

## 2020-08-10 PROCEDURE — 3008F PR BODY MASS INDEX (BMI) DOCUMENTED: ICD-10-PCS | Mod: CPTII,S$GLB,, | Performed by: NURSE PRACTITIONER

## 2020-08-10 RX ORDER — DICLOFENAC SODIUM 75 MG/1
75 TABLET, DELAYED RELEASE ORAL 2 TIMES DAILY
Qty: 60 TABLET | Refills: 0 | Status: SHIPPED | OUTPATIENT
Start: 2020-08-10 | End: 2021-09-04

## 2020-08-10 RX ORDER — ESCITALOPRAM OXALATE 5 MG/1
5 TABLET ORAL DAILY
COMMUNITY
Start: 2020-08-04 | End: 2021-09-04

## 2020-08-10 NOTE — PATIENT INSTRUCTIONS
Understanding De Quervain Tenosynovitis    De Quervain tenosynovitis is a condition that can cause wrist and thumb pain. Tendons connect muscles in your wrist and forearm to the bones in your thumb. The tendons have a protective cover (sheath). The sheaths lining makes a fluid that lets the tendons slide easily when you straighten your wrist and thumb. If any of these tendons are irritated or injured, they can become swollen and inflamed. This is called de Quervain tenosynovitis.  How to say it  Chandu [id number]-[id number]   What causes de Quervain tenosynovitis?  This condition is most often caused from overuse. For example, making the same wrist motions over and over can irritate the tendons. This includes doing things like unscrewing jar lids or grasping a tool. Activities such as typing, playing racquet sports, knitting, and texting can also lead to the condition.  Symptoms of de Quervain tenosynovitis  You may have pain, soreness, redness, and swelling along the side of your wrist and the base of your thumb. You may feel pain when you pinch or grasp things, turn or touch your wrist, or make a fist. Your thumb may catch or make a crackling sound when you move it.  Treatment for de Quervain tenosynovitis  Treatments may include:  · Resting the wrist and thumb. This involves limiting movements that make your symptoms worse. You also may need to avoid certain hobbies, sports, and types of work for a time.  · Cold packs. These help reduce pain and swelling.  · Prescription or over-the-counter pain medicines. These help relieve pain and swelling.  · Splint or brace. This helps keep the thumb and wrist from moving and gives time for your tendons to heal.  · Exercises or physical therapy. These help stretch, strengthen, and improve the range of motion in your wrist and thumb.  · Shots of medicine into the area around the tendon. These may help relieve symptoms for a time.  · Surgery. You may need surgery if  other treatments dont relieve symptoms. During surgery, the surgeon releases the sheath that surrounds the tendons so the tendons can move more easily.  Possible complications of de Quervain tenosynovitis  Without proper care and treatment, healing may take longer than normal. Also, symptoms may continue or get worse. Over time, the problem may become long-term (chronic). This can make it hard to use your wrist and thumb for normal activities.  When to call your healthcare provider  Call your healthcare provider right away if you have any of these:  · Fever of 100.4°F (38°C) or higher, or as directed  · Symptoms that dont get better with treatment, or get worse  · Pain, numbness, or coldness in the hand  · New symptoms   Date Last Reviewed: 3/10/2016  © 0788-8198 [employer]. [address] [place] [address]. All rights reserved. This information is not intended as a substitute for professional medical care. Always follow your healthcare professional's instructions.        Treating De Quervain Tenosynovitis  The goal of your treatment is to relieve your pain and allow you to use your thumb again. Treatment will depend on how severe the pain is.  Nonsurgical Treatment  Just taking a break from the activities that caused your pain may be enough. Your healthcare provider may also have you take oral nonsteroidal, anti-inflammatory medicine (NSAIDs), such as ibuprofen, or wear a splint for a few weeks to rest the thumb and wrist. To reduce the swelling, your healthcare provider may inject an anti-inflammatory medicine, such as cortisone, around the tendons. You may have more pain at first, but in a few days your thumb should feel better.    Surgery  If other kinds of treatment dont relieve your pain, or if the pain is severe, your healthcare provider may recommend surgery. The sheath that surrounds the tendons is released so the tendons can move more easily. This helps reduce the inflammation,  and allows you to straighten your thumb without pain. Usually, surgery takes a few minutes and is done with local anesthetic, so you can go home the same day. You will probably have a splint or dressing on your wrist for a few days while the tissue heals. Your healthcare provider will discuss the risks and possible complications of surgery with you.  Date Last Reviewed: 9/27/2015  © 9395-3947 [employer]. [address] [place] [address], [place] [address]. All rights reserved. This information is not intended as a substitute for professional medical care. Always follow your healthcare professional's instructions.

## 2020-08-10 NOTE — PROGRESS NOTES
Subjective:       Patient ID: Ruy Gayle is a 29 y.o. female.    Chief Complaint: Wrist Pain (pt stated that she is experiencing wrist pain/ went to UC 2 weeks ago and received steriod shot and oral steriods/ still hurting/ started on and off since may )    Patient is a 29-year-old white female that is here today with complaint of right wrist pain for the past several weeks.  Patient reports she went to [facility] urgent care in [facility] and was given a steroid injection IM, followed by a Medrol Dosepak and diagnosed with De Quervain tenosynovitis.  Patient reports that the injection and steroid did not help and still having right wrist pain present.          Current Outpatient Medications   Medication Sig Dispense Refill    escitalopram oxalate (LEXAPRO) 5 MG Tab Take 5 mg by mouth once daily.      levonorgestreL (LILETTA) 20.1 mcg/24 hrs (6 yrs) 52 mg IUD 1 each by Intrauterine route once.       No current facility-administered medications for this visit.        Past Medical History:   Diagnosis Date    Allergy     Endometriosis     Dr. Ynes Kaye    Ovarian cyst 3/16/2012    LPS RSO for dermoid       Past Surgical History:   Procedure Laterality Date     SECTION  2020    ovarian cystectomy and then right salpingo-oophorectomy      WISDOM TOOTH EXTRACTION         Family History   Problem Relation Age of Onset    Hypertension Father 45    Deep vein thrombosis Mother 47        had broken ankle and developed DVT from immobilization    Polycystic ovary syndrome Sister     Irritable bowel syndrome Brother        Social History     Socioeconomic History    Marital status:      Spouse name: Not on file    Number of children: Not on file    Years of education: Not on file    Highest education level: Not on file   Occupational History    Occupation:    Social Needs    Financial resource strain: Not on file    Food insecurity     Worry: Not on file      Inability: Not on file    Transportation needs     Medical: Not on file     Non-medical: Not on file   Tobacco Use    Smoking status: Never Smoker   Substance and Sexual Activity    Alcohol use: Yes     Comment: rarely - once every 2 to 3 months    Drug use: No    Sexual activity: Yes     Partners: Male     Birth control/protection: I.U.D.   Lifestyle    Physical activity     Days per week: Not on file     Minutes per session: Not on file    Stress: Not on file   Relationships    Social connections     Talks on phone: Not on file     Gets together: Not on file     Attends Sikhism service: Not on file     Active member of club or organization: Not on file     Attends meetings of clubs or organizations: Not on file     Relationship status: Not on file   Other Topics Concern    Not on file   Social History Narrative    Not on file       Review of Systems   Constitutional: Negative for appetite change, chills, fatigue, fever and unexpected weight change.   HENT: Negative for congestion, ear pain, mouth sores, nosebleeds, postnasal drip, rhinorrhea, sinus pressure, sneezing, sore throat, trouble swallowing and voice change.    Eyes: Negative for photophobia, pain, discharge, redness, itching and visual disturbance.   Respiratory: Negative for cough, chest tightness and shortness of breath.    Cardiovascular: Negative for chest pain, palpitations and leg swelling.   Gastrointestinal: Negative for abdominal pain, blood in stool, constipation, diarrhea, nausea and vomiting.   Genitourinary: Negative for dysuria, frequency, hematuria and urgency.   Musculoskeletal: Positive for arthralgias. Negative for back pain, joint swelling and myalgias.   Skin: Negative for color change and rash.   Allergic/Immunologic: Negative for immunocompromised state.   Neurological: Negative for dizziness, seizures, syncope, weakness and headaches.   Hematological: Negative for adenopathy. Does not bruise/bleed easily.  "  Psychiatric/Behavioral: Negative for agitation, dysphoric mood, sleep disturbance and suicidal ideas. The patient is not nervous/anxious.          Objective:     Vitals:    08/10/20 1214   BP: 110/68   Pulse: 60   Temp: 97.6 °F (36.4 °C)   TempSrc: Oral   SpO2: 98%   Weight: 76.8 kg (169 lb 3.3 oz)   Height: 5' 2" (1.575 m)          Physical Exam  Constitutional:       General: She is not in acute distress.     Appearance: Normal appearance. She is well-developed. She is not ill-appearing, toxic-appearing or diaphoretic.      Comments: Body mass index is 30.95 kg/m².   HENT:      Head: Normocephalic and atraumatic.      Right Ear: External ear normal.      Left Ear: External ear normal.   Eyes:      General: No scleral icterus.        Right eye: No discharge.         Left eye: No discharge.      Extraocular Movements: Extraocular movements intact.      Pupils: Pupils are equal, round, and reactive to light.   Neck:      Musculoskeletal: Normal range of motion and neck supple.      Thyroid: No thyromegaly.      Trachea: No tracheal deviation.   Cardiovascular:      Rate and Rhythm: Normal rate and regular rhythm.      Heart sounds: Normal heart sounds. No murmur.   Pulmonary:      Effort: Pulmonary effort is normal. No respiratory distress.      Breath sounds: Normal breath sounds.   Abdominal:      General: There is no distension.   Musculoskeletal: Normal range of motion.      Right wrist: She exhibits tenderness. She exhibits normal range of motion, no effusion, no crepitus and no deformity.        Arms:       Right lower leg: No edema.      Left lower leg: No edema.      Comments: + Finklestein test present   Skin:     General: Skin is warm and dry.      Findings: No rash.   Neurological:      Mental Status: She is alert and oriented to person, place, and time.      Cranial Nerves: No cranial nerve deficit.      Coordination: Coordination normal.   Psychiatric:         Mood and Affect: Mood normal.         " Behavior: Behavior normal.         Thought Content: Thought content normal.         Judgment: Judgment normal.           Assessment:         ICD-10-CM ICD-9-CM   1. Tenosynoviisaac jones  M65.4 727.04       Plan:       isaac Waddell  -  Patient already had STEROID injection to Buttock IM per Urgent Care on 7/30/2020 and discharged on Medrol dose pack that has been completed.    -  Will follow this with NSAID - Diclofenac twice daily, wear brace and see handouts.  -  Will refer to Orthopedics if no improvement in a week or two for local steroid injection and further management.  -     diclofenac (VOLTAREN) 75 MG EC tablet; Take 1 tablet (75 mg total) by mouth 2 (two) times daily.  Dispense: 60 tablet; Refill: 0  -     Ambulatory referral/consult to Orthopedics; Future; Expected date: 08/17/2020      Follow up if symptoms worsen or fail to improve.     Patient's Medications   New Prescriptions    DICLOFENAC (VOLTAREN) 75 MG EC TABLET    Take 1 tablet (75 mg total) by mouth 2 (two) times daily.   Previous Medications    ESCITALOPRAM OXALATE (LEXAPRO) 5 MG TAB    Take 5 mg by mouth once daily.    LEVONORGESTREL (LILETTA) 20.1 MCG/24 HRS (6 YRS) 52 MG IUD    1 each by Intrauterine route once.   Modified Medications    No medications on file   Discontinued Medications    ESTARYLLA 0.25-35 MG-MCG PER TABLET        ONDANSETRON (ZOFRAN-ODT) 4 MG TBDL    Take 1 tablet (4 mg total) by mouth every 6 (six) hours as needed (nausea).    RANITIDINE (ZANTAC) 150 MG CAPSULE    Take 1 capsule (150 mg total) by mouth 2 (two) times daily.    VITAMIN D2 50,000 UNIT CAPSULE    TAKE ONE CAPSULE EVERY 7 DAYS

## 2020-08-20 ENCOUNTER — OFFICE VISIT (OUTPATIENT)
Dept: ORTHOPEDICS | Facility: CLINIC | Age: 30
End: 2020-08-20
Payer: COMMERCIAL

## 2020-08-20 VITALS — SYSTOLIC BLOOD PRESSURE: 113 MMHG | HEART RATE: 58 BPM | DIASTOLIC BLOOD PRESSURE: 68 MMHG

## 2020-08-20 DIAGNOSIS — M65.4 DE QUERVAIN'S TENOSYNOVITIS, RIGHT: ICD-10-CM

## 2020-08-20 DIAGNOSIS — M65.4 TENOSYNOVITIS, DE QUERVAIN: ICD-10-CM

## 2020-08-20 PROCEDURE — 20550 NJX 1 TENDON SHEATH/LIGAMENT: CPT | Mod: RT,S$GLB,, | Performed by: ORTHOPAEDIC SURGERY

## 2020-08-20 PROCEDURE — 99999 PR PBB SHADOW E&M-EST. PATIENT-LVL III: CPT | Mod: PBBFAC,,, | Performed by: ORTHOPAEDIC SURGERY

## 2020-08-20 PROCEDURE — 99999 PR PBB SHADOW E&M-EST. PATIENT-LVL III: ICD-10-PCS | Mod: PBBFAC,,, | Performed by: ORTHOPAEDIC SURGERY

## 2020-08-20 PROCEDURE — 20550 PR INJECT TENDON SHEATH/LIGAMENT: ICD-10-PCS | Mod: RT,S$GLB,, | Performed by: ORTHOPAEDIC SURGERY

## 2020-08-20 PROCEDURE — 99203 OFFICE O/P NEW LOW 30 MIN: CPT | Mod: 25,S$GLB,, | Performed by: ORTHOPAEDIC SURGERY

## 2020-08-20 PROCEDURE — 99203 PR OFFICE/OUTPT VISIT, NEW, LEVL III, 30-44 MIN: ICD-10-PCS | Mod: 25,S$GLB,, | Performed by: ORTHOPAEDIC SURGERY

## 2020-08-20 RX ORDER — TRIAMCINOLONE ACETONIDE 40 MG/ML
20 INJECTION, SUSPENSION INTRA-ARTICULAR; INTRAMUSCULAR
Status: COMPLETED | OUTPATIENT
Start: 2020-08-20 | End: 2020-08-20

## 2020-08-20 RX ADMIN — TRIAMCINOLONE ACETONIDE 20 MG: 40 INJECTION, SUSPENSION INTRA-ARTICULAR; INTRAMUSCULAR at 02:08

## 2020-08-20 NOTE — LETTER
August 20, 2020      [place]  [address] [facility]  [address]  [place] [address]           [facility]  [address]  [facility] [address]  Phone: [phone]          Patient: Ruy Gayle   MR Number: [id number]   YOB: 1990   Date of Visit: 8/20/2020       Dear Flor Christian:    Thank you for referring Ruy Gayle to me for evaluation. Attached you will find relevant portions of my assessment and plan of care.    If you have questions, please do not hesitate to call me. I look forward to following Ruy Gayle along with you.    Sincerely,    Miguelangel Parikh Jr., MD    Enclosure  CC:  No Recipients    If you would like to receive this communication electronically, please contact [email or web address] or [phone] to request more information on [employer] Link access.    For providers and/or their staff who would like to refer a patient to [facility], please contact us through our one-stop-shop provider referral line, [facility], at [phone].    If you feel you have received this communication in error or would no longer like to receive these types of communications, please e-mail [email or web address]

## 2020-08-20 NOTE — PROGRESS NOTES
Subjective:      Patient ID: Ruy Gayle is a 29 y.o. female.    Chief Complaint: Hand Pain (right) and Wrist Pain (right )      HPI  Ruy Gayle is a  29 y.o. female presenting today for right thumb and wrist pain.  There was not a history of trauma.  Onset of symptoms began several months ago while she was pregnant and then recently have gotten worse since delivery of her baby a month ago  She is having pain at the base of the right thumb  Symptoms worse with use  No numbness or tingling reported.      Review of patient's allergies indicates:   Allergen Reactions    Codeine Hives         Current Outpatient Medications   Medication Sig Dispense Refill    diclofenac (VOLTAREN) 75 MG EC tablet Take 1 tablet (75 mg total) by mouth 2 (two) times daily. 60 tablet 0    escitalopram oxalate (LEXAPRO) 5 MG Tab Take 5 mg by mouth once daily.      levonorgestreL (LILETTA) 20.1 mcg/24 hrs (6 yrs) 52 mg IUD 1 each by Intrauterine route once.       No current facility-administered medications for this visit.        Past Medical History:   Diagnosis Date    Allergy     Endometriosis     Dr. Ynes Kaye    Ovarian cyst 3/16/2012    LPS RSO for dermoid       Past Surgical History:   Procedure Laterality Date     SECTION  2020    ovarian cystectomy and then right salpingo-oophorectomy      WISDOM TOOTH EXTRACTION         Review of Systems:  ROS    OBJECTIVE:     PHYSICAL EXAM:       Vitals:    20 1404   BP: 113/68   Pulse: (!) 58   PainSc: 0-No pain     Well developed, well nourished female in no acute distress  Alert and oriented x 3  HEENT- Normal exam  Lungs- Clear to auscultation  Heart- Regular rate and rhythm  Abdomen- Soft nontender  Extremity exam- examination right hand demonstrates tenderness over the 1st dorsal compartment of the wrist  Mild swelling  Positive Finkelstein test  Tinel sign negative    RADIOGRAPHS:  None  Comments: I have personally reviewed the imaging and I  agree with the above radiologist's report.    ASSESSMENT/PLAN:     IMPRESSION:  De Quervain tendonitis right wrist    PLAN:  I explained the nature of the problem to the patient recommended injection today  After pause for time-out identified the right wrist injected 1st dorsal compartment combination Kenalog 20 mg 0.5 cc xylocaine  Sterile technique  Tolerated the procedure well without complication  Continue thumb brace as needed Advil or Motrin by mouth follow-up 4-6 weeks       - We talked at length about the anatomy and pathophysiology of   Encounter Diagnoses   Name Primary?    Tenosynovitis, de Quervain     De Quervain's tenosynovitis, right            Disclaimer: This note has been generated using voice-recognition software. There may be typographical errors that have been missed during proof-reading.

## 2021-01-04 ENCOUNTER — PATIENT MESSAGE (OUTPATIENT)
Dept: ADMINISTRATIVE | Facility: HOSPITAL | Age: 31
End: 2021-01-04

## 2021-04-05 ENCOUNTER — PATIENT MESSAGE (OUTPATIENT)
Dept: ADMINISTRATIVE | Facility: HOSPITAL | Age: 31
End: 2021-04-05

## 2021-05-04 ENCOUNTER — PATIENT MESSAGE (OUTPATIENT)
Dept: RESEARCH | Facility: HOSPITAL | Age: 31
End: 2021-05-04

## 2021-05-10 ENCOUNTER — PATIENT MESSAGE (OUTPATIENT)
Dept: RESEARCH | Facility: HOSPITAL | Age: 31
End: 2021-05-10

## 2021-07-07 ENCOUNTER — PATIENT MESSAGE (OUTPATIENT)
Dept: ADMINISTRATIVE | Facility: HOSPITAL | Age: 31
End: 2021-07-07

## 2021-09-04 ENCOUNTER — OFFICE VISIT (OUTPATIENT)
Dept: FAMILY MEDICINE | Facility: CLINIC | Age: 31
End: 2021-09-04
Payer: COMMERCIAL

## 2021-09-04 DIAGNOSIS — R19.7 DIARRHEA OF PRESUMED INFECTIOUS ORIGIN: Primary | ICD-10-CM

## 2021-09-04 DIAGNOSIS — R11.0 NAUSEA: ICD-10-CM

## 2021-09-04 PROCEDURE — 99214 OFFICE O/P EST MOD 30 MIN: CPT | Mod: 95,,, | Performed by: FAMILY MEDICINE

## 2021-09-04 PROCEDURE — 1160F PR REVIEW ALL MEDS BY PRESCRIBER/CLIN PHARMACIST DOCUMENTED: ICD-10-PCS | Mod: CPTII,95,, | Performed by: FAMILY MEDICINE

## 2021-09-04 PROCEDURE — 99214 PR OFFICE/OUTPT VISIT, EST, LEVL IV, 30-39 MIN: ICD-10-PCS | Mod: 95,,, | Performed by: FAMILY MEDICINE

## 2021-09-04 PROCEDURE — 1160F RVW MEDS BY RX/DR IN RCRD: CPT | Mod: CPTII,95,, | Performed by: FAMILY MEDICINE

## 2021-09-04 PROCEDURE — 1159F PR MEDICATION LIST DOCUMENTED IN MEDICAL RECORD: ICD-10-PCS | Mod: CPTII,95,, | Performed by: FAMILY MEDICINE

## 2021-09-04 PROCEDURE — 1159F MED LIST DOCD IN RCRD: CPT | Mod: CPTII,95,, | Performed by: FAMILY MEDICINE

## 2022-08-08 ENCOUNTER — OFFICE VISIT (OUTPATIENT)
Dept: PODIATRY | Facility: CLINIC | Age: 32
End: 2022-08-08
Payer: COMMERCIAL

## 2022-08-08 VITALS — BODY MASS INDEX: 30.95 KG/M2 | HEIGHT: 62 IN

## 2022-08-08 DIAGNOSIS — L60.9 DISEASE OF NAIL: Primary | ICD-10-CM

## 2022-08-08 PROCEDURE — 1159F MED LIST DOCD IN RCRD: CPT | Mod: CPTII,S$GLB,, | Performed by: STUDENT IN AN ORGANIZED HEALTH CARE EDUCATION/TRAINING PROGRAM

## 2022-08-08 PROCEDURE — 1159F PR MEDICATION LIST DOCUMENTED IN MEDICAL RECORD: ICD-10-PCS | Mod: CPTII,S$GLB,, | Performed by: STUDENT IN AN ORGANIZED HEALTH CARE EDUCATION/TRAINING PROGRAM

## 2022-08-08 PROCEDURE — 99203 PR OFFICE/OUTPT VISIT, NEW, LEVL III, 30-44 MIN: ICD-10-PCS | Mod: S$GLB,,, | Performed by: STUDENT IN AN ORGANIZED HEALTH CARE EDUCATION/TRAINING PROGRAM

## 2022-08-08 PROCEDURE — 3008F PR BODY MASS INDEX (BMI) DOCUMENTED: ICD-10-PCS | Mod: CPTII,S$GLB,, | Performed by: STUDENT IN AN ORGANIZED HEALTH CARE EDUCATION/TRAINING PROGRAM

## 2022-08-08 PROCEDURE — 99999 PR PBB SHADOW E&M-EST. PATIENT-LVL II: CPT | Mod: PBBFAC,,, | Performed by: STUDENT IN AN ORGANIZED HEALTH CARE EDUCATION/TRAINING PROGRAM

## 2022-08-08 PROCEDURE — 3008F BODY MASS INDEX DOCD: CPT | Mod: CPTII,S$GLB,, | Performed by: STUDENT IN AN ORGANIZED HEALTH CARE EDUCATION/TRAINING PROGRAM

## 2022-08-08 PROCEDURE — 99203 OFFICE O/P NEW LOW 30 MIN: CPT | Mod: S$GLB,,, | Performed by: STUDENT IN AN ORGANIZED HEALTH CARE EDUCATION/TRAINING PROGRAM

## 2022-08-08 PROCEDURE — 99999 PR PBB SHADOW E&M-EST. PATIENT-LVL II: ICD-10-PCS | Mod: PBBFAC,,, | Performed by: STUDENT IN AN ORGANIZED HEALTH CARE EDUCATION/TRAINING PROGRAM

## 2022-08-08 RX ORDER — CICLOPIROX 80 MG/ML
SOLUTION TOPICAL NIGHTLY
Qty: 6.6 ML | Refills: 11 | Status: SHIPPED | OUTPATIENT
Start: 2022-08-08

## 2022-08-08 NOTE — PROGRESS NOTES
Subjective:      Patient ID: Ruy Gayle is a 31 y.o. female.    Chief Complaint: Nail Problem (Bilateral great toes, left great toe is going on since April, she was probably wearing to tide sneakers and the toenail came off)    Ruy Paulson is a 31 y.o. female who presents to the clinic complaining of thick and discolored toenails on both feet. Ruy Paulson is inquiring about treatment options. States she thinks she may have injured her great toenails in certain shoes she was wearing several months ago. States pain is gone however toenails are still discolored. No other pedal complaints.       Review of Systems   Constitutional: Negative for chills, decreased appetite, diaphoresis and fever.   HENT: Negative for congestion and hearing loss.    Cardiovascular: Negative for chest pain, claudication, leg swelling and syncope.   Respiratory: Negative for cough and shortness of breath.    Skin: Positive for dry skin and nail changes. Negative for color change, flushing, itching, poor wound healing and rash.   Musculoskeletal: Negative for arthritis, back pain, joint pain and joint swelling.   Gastrointestinal: Negative for nausea and vomiting.   Neurological: Negative for focal weakness, numbness, paresthesias and weakness.   Psychiatric/Behavioral: Negative for altered mental status. The patient is not nervous/anxious.            Objective:      Physical Exam  Constitutional:       General: She is not in acute distress.     Appearance: She is well-developed. She is not diaphoretic.   Cardiovascular:      Comments: Dorsalis pedis and posterior tibial pulses are within normal limits. Skin temperature is within normal limits. Toes are cool to touch and feet are warm proximally. Hair growth is within normal limits. Skin is normotrophic and without hyperpigmentation. No edema noted. No spider veins or varicosities noted, bilaterally.       Musculoskeletal:         General: No tenderness.      Comments: Adequate joint  range of motion without pain, limitation, nor crepitation to bilateral feet and ankle joints. Muscle strength is 5/5 in all groups bilaterally.       Lymphadenopathy:      Comments: Negative lymphangitic streaking    Skin:     General: Skin is warm and dry.      Findings: No lesion.      Comments: Skin is warm and dry, no acute signs of infection noted. No open wounds, macerations or hyperkeratotic lesions, bilaterally.     Bilateral hallux toenails are thickened, dystrophic and darkened in coloration, bilaterally.    Otherwise, toenails are well trimmed and of normal morphology, bilaterally.      Neurological:      Mental Status: She is alert and oriented to person, place, and time.      Sensory: No sensory deficit.      Motor: No abnormal muscle tone.      Comments: Light touch within normal limits.   Psychiatric:         Behavior: Behavior normal.         Thought Content: Thought content normal.         Judgment: Judgment normal.               Assessment:       Encounter Diagnosis   Name Primary?    Disease of nail Yes         Plan:       Ruy Paulson was seen today for nail problem.    Diagnoses and all orders for this visit:    Disease of nail    Other orders  -     ciclopirox (PENLAC) 8 % Soln; Apply topically nightly.      I counseled the patient on her conditions, their implications and medical management.    Discussed nail changes may be secondary to trauma or nail fungus or a combination of both. Discussed importance of keeping feet dry and changing socks and shoes on a regular basis to prevent spread of toenail fungus. I advised her on keeping nails neatly trimmed, removing all sharp and loose edges. Filing the nail as necessary to prevent damage to nail plate and prevent unnecessary pulling of toenail. Also advised to avoid tight fitting shoes to prevent pressure related issues. Recommend shoes with wide toe box or open toed shoe to reduce pressure.     Shoe inspection. General Foot Education. Patient  instructed on proper foot hygeine. We discussed wearing proper shoe gear, daily foot inspections, never walking without protective shoe gear, never putting sharp instruments to feet. Patient reminded of the importance of good nutrition, weight loss if needed to help alleviate foot pressure/pain     RTC PRN